# Patient Record
Sex: FEMALE | Race: WHITE | NOT HISPANIC OR LATINO | Employment: UNEMPLOYED | URBAN - METROPOLITAN AREA
[De-identification: names, ages, dates, MRNs, and addresses within clinical notes are randomized per-mention and may not be internally consistent; named-entity substitution may affect disease eponyms.]

---

## 2018-08-29 ENCOUNTER — OFFICE VISIT (OUTPATIENT)
Dept: ORTHOPEDIC SURGERY | Facility: CLINIC | Age: 48
End: 2018-08-29

## 2018-08-29 VITALS — HEIGHT: 63 IN | BODY MASS INDEX: 23.92 KG/M2 | WEIGHT: 135 LBS

## 2018-08-29 DIAGNOSIS — Z96.652 TOTAL KNEE REPLACEMENT STATUS, LEFT: Primary | ICD-10-CM

## 2018-08-29 DIAGNOSIS — G90.522 COMPLEX REGIONAL PAIN SYNDROME TYPE 1 OF LEFT LOWER EXTREMITY: ICD-10-CM

## 2018-08-29 PROCEDURE — 73560 X-RAY EXAM OF KNEE 1 OR 2: CPT | Performed by: ORTHOPAEDIC SURGERY

## 2018-08-29 PROCEDURE — 99205 OFFICE O/P NEW HI 60 MIN: CPT | Performed by: ORTHOPAEDIC SURGERY

## 2018-08-29 RX ORDER — CETIRIZINE HYDROCHLORIDE 10 MG/1
10 TABLET ORAL DAILY
Refills: 2 | COMMUNITY
Start: 2018-07-31

## 2018-08-29 RX ORDER — OXYCODONE AND ACETAMINOPHEN 10; 325 MG/1; MG/1
1 TABLET ORAL EVERY 4 HOURS PRN
Refills: 0 | COMMUNITY
Start: 2018-06-15 | End: 2018-09-13

## 2018-08-29 RX ORDER — MIDODRINE HYDROCHLORIDE 5 MG/1
2.5 TABLET ORAL
Refills: 12 | COMMUNITY
Start: 2018-06-01 | End: 2018-09-13

## 2018-08-29 RX ORDER — TOPIRAMATE 100 MG/1
100 TABLET, FILM COATED ORAL
Refills: 3 | COMMUNITY
Start: 2018-07-31

## 2018-08-29 RX ORDER — GABAPENTIN 300 MG/1
CAPSULE ORAL 3 TIMES DAILY
Refills: 2 | COMMUNITY
Start: 2018-07-31

## 2018-08-29 RX ORDER — ATENOLOL 25 MG/1
TABLET ORAL
COMMUNITY
Start: 2018-08-28

## 2018-08-29 RX ORDER — AMMONIUM LACTATE 12 G/100G
CREAM TOPICAL
Refills: 10 | COMMUNITY
Start: 2018-07-31

## 2018-08-29 RX ORDER — TRIAMCINOLONE ACETONIDE 1 MG/G
CREAM TOPICAL
Refills: 1 | COMMUNITY
Start: 2018-07-31

## 2018-08-29 RX ORDER — SUCRALFATE 1 G/1
1 TABLET ORAL
Refills: 5 | COMMUNITY
Start: 2018-07-31

## 2018-08-29 RX ORDER — FLUOXETINE HYDROCHLORIDE 40 MG/1
40 CAPSULE ORAL DAILY
Refills: 0 | COMMUNITY
Start: 2018-07-31

## 2018-08-29 RX ORDER — OXYCODONE HYDROCHLORIDE AND ACETAMINOPHEN 5; 325 MG/1; MG/1
1 TABLET ORAL EVERY 6 HOURS PRN
Refills: 0 | COMMUNITY
Start: 2018-06-05 | End: 2018-09-13

## 2018-08-29 RX ORDER — ISOSORBIDE MONONITRATE 30 MG/1
30 TABLET, EXTENDED RELEASE ORAL
Refills: 6 | COMMUNITY
Start: 2018-06-01

## 2018-08-29 RX ORDER — CYCLOSPORINE 0.5 MG/ML
EMULSION OPHTHALMIC
Refills: 2 | COMMUNITY
Start: 2018-07-31

## 2018-08-29 RX ORDER — VENLAFAXINE HYDROCHLORIDE 37.5 MG/1
37.5 CAPSULE, EXTENDED RELEASE ORAL 2 TIMES DAILY
Refills: 3 | COMMUNITY
Start: 2018-07-31

## 2018-08-29 RX ORDER — BETAMETHASONE DIPROPIONATE 0.5 MG/G
CREAM TOPICAL
Refills: 0 | COMMUNITY
Start: 2018-08-20

## 2018-08-29 RX ORDER — OXYCODONE AND ACETAMINOPHEN 7.5; 325 MG/1; MG/1
1 TABLET ORAL EVERY 8 HOURS PRN
Refills: 0 | COMMUNITY
Start: 2018-07-10 | End: 2018-09-13

## 2018-08-29 RX ORDER — HYDROCODONE BITARTRATE AND ACETAMINOPHEN 10; 325 MG/1; MG/1
1 TABLET ORAL 3 TIMES DAILY
Refills: 0 | COMMUNITY
Start: 2018-08-27

## 2018-08-29 RX ORDER — OMEPRAZOLE 20 MG/1
20 CAPSULE, DELAYED RELEASE ORAL DAILY
Refills: 3 | COMMUNITY
Start: 2018-07-31

## 2018-08-29 RX ORDER — PREDNISONE 1 MG/1
TABLET ORAL
Refills: 0 | COMMUNITY
Start: 2018-06-29 | End: 2018-09-13

## 2018-08-29 RX ORDER — ROPINIROLE 1 MG/1
1 TABLET, FILM COATED ORAL
Refills: 0 | COMMUNITY
Start: 2018-07-31

## 2018-08-29 RX ORDER — DIAZEPAM 5 MG/1
5 TABLET ORAL 3 TIMES DAILY
Refills: 0 | COMMUNITY
Start: 2018-08-03

## 2018-08-31 PROBLEM — Z96.652 TOTAL KNEE REPLACEMENT STATUS, LEFT: Status: ACTIVE | Noted: 2018-08-31

## 2018-08-31 PROBLEM — G90.522 COMPLEX REGIONAL PAIN SYNDROME TYPE 1 OF LEFT LOWER EXTREMITY: Status: ACTIVE | Noted: 2018-08-31

## 2018-09-10 ENCOUNTER — TELEPHONE (OUTPATIENT)
Dept: ORTHOPEDIC SURGERY | Facility: CLINIC | Age: 48
End: 2018-09-10

## 2018-09-10 NOTE — TELEPHONE ENCOUNTER
I feel that this conversation should be led by the MAs at the office of the pain clinic managing physician.  I really don't know the protocols of the pain clinic and therefore will not be able to tell the patient the details of what to expect at that visit.  Please refer the patient to the MAs at the pain clinic management office.  Thank you

## 2018-09-13 ENCOUNTER — RESULTS ENCOUNTER (OUTPATIENT)
Dept: PAIN MEDICINE | Facility: CLINIC | Age: 48
End: 2018-09-13

## 2018-09-13 ENCOUNTER — OFFICE VISIT (OUTPATIENT)
Dept: PAIN MEDICINE | Facility: CLINIC | Age: 48
End: 2018-09-13

## 2018-09-13 VITALS
SYSTOLIC BLOOD PRESSURE: 108 MMHG | HEIGHT: 63 IN | TEMPERATURE: 97.6 F | BODY MASS INDEX: 25.52 KG/M2 | DIASTOLIC BLOOD PRESSURE: 75 MMHG | RESPIRATION RATE: 16 BRPM | WEIGHT: 144 LBS | HEART RATE: 114 BPM | OXYGEN SATURATION: 98 %

## 2018-09-13 DIAGNOSIS — Z96.652 TOTAL KNEE REPLACEMENT STATUS, LEFT: ICD-10-CM

## 2018-09-13 DIAGNOSIS — G89.29 OTHER CHRONIC PAIN: Primary | ICD-10-CM

## 2018-09-13 DIAGNOSIS — G90.522 COMPLEX REGIONAL PAIN SYNDROME TYPE 1 OF LEFT LOWER EXTREMITY: ICD-10-CM

## 2018-09-13 DIAGNOSIS — G89.29 OTHER CHRONIC PAIN: ICD-10-CM

## 2018-09-13 LAB
POC AMPHETAMINES: NEGATIVE
POC BARBITURATES: NEGATIVE
POC BENZODIAZEPHINES: NEGATIVE
POC COCAINE: NEGATIVE
POC METHADONE: NEGATIVE
POC METHAMPHETAMINE SCREEN URINE: NEGATIVE
POC OPIATES: NEGATIVE
POC OXYCODONE: NEGATIVE
POC PHENCYCLIDINE: NEGATIVE
POC PROPOXYPHENE: NEGATIVE
POC THC: NEGATIVE
POC TRICYCLIC ANTIDEPRESSANTS: NEGATIVE

## 2018-09-13 PROCEDURE — 80305 DRUG TEST PRSMV DIR OPT OBS: CPT | Performed by: NURSE PRACTITIONER

## 2018-09-13 PROCEDURE — 99214 OFFICE O/P EST MOD 30 MIN: CPT | Performed by: NURSE PRACTITIONER

## 2018-09-13 NOTE — PATIENT INSTRUCTIONS
---  Education about Sympathetic Blockade:    The sympathetic nerve chain is part of the sympathetic nervous system that is located in your neck, on either side of your voice box. A lumbar sympathetic block is an injection of medication into these nerves that can help relieve pain in the groin or lower extremity or pelvis.   It also can help increase circulation and blood supply to the lower extremity    A sympathetic chain block may be right for you if you have nerve pain in the head, neck, upper arm or upper chest that does not respond to other treatment.  A stellate ganglion block is used to diagnose or treat circulation problems or nerve injuries, including but not limited to Complex Regional Pain Syndrome Types 1 or 2, also known as Reflex Sympathetic Dystrophy (RSD) or Causalgia, respectively, and also sometimes for Phantom Limb Pain or other nerve problems.      LSB Blockade:    First, you may be given an intravenous medication to relax you. Then, you’ll lie on your back on an x-ray table and your neck will be cleansed.  The doctor will insert a thin needle into your lower back near the spine, and inject a local anesthetic. Then, with x-ray or ultrasound guidance, he or she will insert a second needle to the location on the anterior aspect of the spine, in the vicinity of the sympathetic chain,  and carefully inject an anesthetic medication.  Usually, the procedure takes less than 30 minutes, and you can go home the same day after being monitored in the recovery room.  How effective is an LSB block?  Some patients report pain relief immediately after the injection, but the pain may return a few hours later as the local anesthetic wears off. Other patients have longer term relief that outlasts the duration of the local anesthetic and helps them to reduce their medication use and increase their participation in physical therapy.  How long the pain relief lasts is different for each patient. Some may be  pain-free for days or weeks. Usually people need a series of injections to continue the pain relief. Sometimes it takes only two injections; sometimes it takes more than 10. The relief tends to last longer with each treatment.  What are the risks?  The risk of complications from a block is very low. However, there could be bruising or soreness at the injection site. Serious complications, including infection, bleeding and nerve damage, are uncommon.  Side effects of the procedure may include:  Bleeding, tingling, numbness.   These effects will subside within a few hours.  What happens after the procedure?  Do not drive or do any rigorous activity for 24 hours after your LSB block.  Take it easy. You can return to your normal activities the next day.  If you have difficulty swallowing or voice changes, then wait until this resolves before eating a meal.    ---

## 2018-09-13 NOTE — PROGRESS NOTES
"CHIEF COMPLAINT  Pt is referred here per Dr Luna for L knee pain which apparently has worsened since having a L TKR on 4/11/18 per Dr East. Pt also had a L knee manipulation in 4/18 followed by a Lumbar LESTER with catheter for 24 hours with minimal increase in L knee ROM.  Standing and any activity while on feet increases knee pain significantly.  Pt had been on oxycodone for about 6 months post op TKR  and now taking Norco.  Hx of P.T. : moderate relief.    Subjective   So Hampton is a 47 y.o. female.   She presents to the office for evaluation of left knee pain. She was referred here by Dr. Luna.     Patient reports left knee pain for the past 2-3 years with progressive worsening of symptoms following knee surgery.  She underwent a left total knee arthroplasty on 4/11/2018 with Dr. East [facility].  She then underwent a left knee manipulation on 4/18/2018 because she was not progressing with PT.   Two weeks later she underwent a second manipulation and scope with lysis of adhesions.  She has had no improvement and has actually continued to decline since the surgeries.  She went to see Dr. Luna for a second opinion.  He thinks that her symptoms are consistent with CRPS and has referred here here for diagnostic/therapeutic lumbar blocks.      C/o left knee pain that is severe. Today her pain is an 8/10 in severity.  The pain is located around the entire knee. The pain is throbbing, aching.  It is constant.  The knee is sensitive to the touch.  It is swollen.  It is warm.  There is no hair growth.  It is sensitive to light touch, even the sheets touching it.  Pain is aggravated with walking, and weight bearing.  Improved with compression, massage, medication.  Has been taking pain medication since her neck surgery in 2014. She is taking hydrocodone 10/325 3/day, Gabapentin 300 mg TID (she reports feeling \"drunk\" with higher doses), takes Ibuprofen 800mg BID.  Reports some benefit with this " regimen, less benefit with Norco than Percocet. Reports that her PCP cannot write for percocet because he is in not in a pain clinic.      History of Present Illness     PEG Assessment   What number best describes your pain on average in the past week?8  What number best describes how, during the past week, pain has interfered with your enjoyment of life?8  What number best describes how, during the past week, pain has interfered with your general activity?  8        Current Outpatient Prescriptions:   •  ammonium lactate (AMLACTIN) 12 % cream, APPLY TO THE AFFECTED AREA(S) TWICE DAILY, Disp: , Rfl: 10  •  atenolol (TENORMIN) 25 MG tablet, , Disp: , Rfl:   •  betamethasone dipropionate (DIPROLENE) 0.05 % cream, APPLY TO THE AFFECTED AREA(S) TWICE DAILY AS NEEDED, Disp: , Rfl: 0  •  cetirizine (zyrTEC) 10 MG tablet, Take 10 mg by mouth Daily., Disp: , Rfl: 2  •  diazePAM (VALIUM) 5 MG tablet, Take 5 mg by mouth 3 (Three) Times a Day., Disp: , Rfl: 0  •  Elastic Bandages & Supports (MEDICAL COMPRESSION THIGH HIGH) misc, WEAR DAILY AS NEEDED 20-30mmHg, Disp: 1 each, Rfl: 0  •  FLUoxetine (PROzac) 40 MG capsule, Take 40 mg by mouth Daily., Disp: , Rfl: 0  •  gabapentin (NEURONTIN) 300 MG capsule, Take  by mouth 3 (Three) Times a Day., Disp: , Rfl: 2  •  HYDROcodone-acetaminophen (NORCO)  MG per tablet, Take 1 tablet by mouth 3 (Three) Times a Day., Disp: , Rfl: 0  •  isosorbide mononitrate (IMDUR) 30 MG 24 hr tablet, Take 30 mg by mouth every night at bedtime., Disp: , Rfl: 6  •  omeprazole (priLOSEC) 20 MG capsule, Take 20 mg by mouth Daily., Disp: , Rfl: 3  •  RESTASIS 0.05 % ophthalmic emulsion, INSTILL ONE DROP IN EACH EYE TWICE DAILY, Disp: , Rfl: 2  •  rOPINIRole (REQUIP) 1 MG tablet, Take 1 mg by mouth every night at bedtime., Disp: , Rfl: 0  •  sucralfate (CARAFATE) 1 g tablet, Take 1 g by mouth every night at bedtime., Disp: , Rfl: 5  •  topiramate (TOPAMAX) 100 MG tablet, Take 100 mg by mouth every night  "at bedtime., Disp: , Rfl: 3  •  triamcinolone (KENALOG) 0.1 % cream, Apply to affected area twice a day for 10-14 days, Disp: , Rfl: 1  •  venlafaxine XR (EFFEXOR-XR) 37.5 MG 24 hr capsule, Take 37.5 mg by mouth 2 (Two) Times a Day., Disp: , Rfl: 3    The following portions of the patient's history were reviewed and updated as appropriate: allergies, current medications, past family history, past medical history, past social history, past surgical history and problem list.    REVIEW OF PERTINENT MEDICAL DATA  Reviewed below office visit encounter with Dr. Luna 8/29/18    Xray 8/29/18             BUENO = 23    Review of Systems   Constitutional: Positive for activity change (decreased). Negative for appetite change.   HENT: Positive for trouble swallowing (hx of esophageal strtching). Negative for hearing loss.    Respiratory: Negative for apnea, chest tightness and shortness of breath.    Cardiovascular: Negative for chest pain.   Gastrointestinal: Negative for constipation, diarrhea and nausea.   Genitourinary: Negative for difficulty urinating and dysuria.   Musculoskeletal: Positive for back pain and neck pain.   Neurological: Negative for dizziness and light-headedness.   Psychiatric/Behavioral: Positive for sleep disturbance. Negative for suicidal ideas.     Vitals:    09/13/18 1148   BP: 108/75   Pulse: 114   Resp: 16   Temp: 97.6 °F (36.4 °C)   SpO2: 98%   Weight: 65.3 kg (144 lb)   Height: 160 cm (62.99\")   PainSc: 7  Comment: L knee pain ranges from 7-9/10   PainLoc: Knee       Objective   Physical Exam   Constitutional: She is oriented to person, place, and time. She appears well-developed and well-nourished. No distress.   HENT:   Head: Normocephalic and atraumatic.   Right Ear: External ear normal.   Left Ear: External ear normal.   Eyes: Pupils are equal, round, and reactive to light. Conjunctivae and EOM are normal.   Neck: Neck supple.   Cardiovascular: Normal rate, regular rhythm and normal heart " sounds.    Pulmonary/Chest: Effort normal and breath sounds normal. No respiratory distress.   Abdominal: Soft. Bowel sounds are normal.   Musculoskeletal:        Left knee: She exhibits decreased range of motion and swelling. Tenderness found.   Hyperesthesia left knee. Left knee warm compared to right knee. Left knee without hair compared to right knee.  Swelling of left knee compared to right.    Neurological: She is alert and oriented to person, place, and time. She has normal strength. A sensory deficit (left knee) is present. Gait (limping) abnormal.   Skin: Skin is warm and dry. No rash noted.   Psychiatric: She has a normal mood and affect. Her behavior is normal.   Nursing note and vitals reviewed.    Assessment/Plan   So was seen today for knee pain.    Diagnoses and all orders for this visit:    Other chronic pain    Complex regional pain syndrome type 1 of left lower extremity  -     Cancel: Case Request  -     Case Request    Total knee replacement status, left  -     Cancel: Case Request      ---  [place] Criteria (for Complex Regional Pain Syndrome diagnosis)    All of these criteria must be met to confirm a clinical diagnosis of CRPS:  ? Continuing Pain that is disproportionate to the inciting event... yes  ? At least 1 Symptom in at least 3 of the 4 Categories  ? At least 1 Sign from at least 2 of the 4 Categories  ? No other diagnosis can better explain the signs / symptoms  yes    Location of painful symptoms: Left knee    [facility] Clinical Symptoms Scores[facility]   Category: Symptom:   Sensory Hyperalgesia (heightened pain severity)   Vasomotor Difference in Skin Temperature   Sudomotor/Edema Swelling / Edema   Motor/Trophic Decreased Range of Motion and Changes in Hair    TOTAL:  4 out of 4     ---    --- left lumbar sympathetic block X 2, 2-4 weeks apart.  Reviewed the procedure at length with the patient.  Included in the review was expectations, complications, risk and benefits.The procedure  was described in detail and the risks, benefits and alternatives were discussed with the patient (including but not limited to: bleeding, infection, nerve damage, worsening of pain, inability to perform injection, paralysis, seizures, and death) who agreed to proceed.  Discussed the potential for sedation if warranted/wanted.  Questions were answered and in a way the patient could understand.  Patient verbalized understanding and wishes to proceed.  This intervention will be ordered.  --- Routine UDS in office today as part of new patient evaluation.  The specimen was viewed and the immunoassay result reviewed and is +OPI, BZD.  This specimen will be sent to [facility] laboratory for confirmation.     --- Patient asks if we will prescribe Percocet rather than the hydrocodone that she is currently taking.  Explained to her that our goal is to manage pain interventionally and that we reserve chronic pain medication for severe pain that has failed all conservative measures or for acute post operative pain. She is outside of the acute post operative period. She does have history of neck and back surgery in addition to the current issue with her knee and I explained to her that we could consider continuing her current medication regimen but that we would definitely not escalate her regimen at this very young age.  She declines opioid risk assessment if we will not consider dose escalation and switching her to Percocet. Furthermore, I explained to her that we do not prescribe opioids concurrently with patients taking benzodiazepines, The CDC and FDA both advise against prescribing of multiple medications that can suppress the CNS. I therefore do not feel that opioid pain medication would be in her best interest.   --- Follow-up after procedure          SAMEER REPORT    As part of the patient's treatment plan, I am prescribing controlled substances. The patient has been made aware of appropriate use of such medications,  including potential risk of somnolence, limited ability to drive and/or work safely, and the potential for dependence or overdose. It has also bee made clear that these medications are for use by this patient only, without concomitant use of alcohol or other substances unless prescribed.     Patient has completed prescribing agreement detailing terms of continued prescribing of controlled substances, including monitoring SAMEER reports, urine drug screening, and pill counts if necessary. The patient is aware that inappropriate use will results in cessation of prescribing such medications.    SAMEER report has been reviewed and scanned into the patient's chart.    As the clinician, I personally reviewed the SAMEER from 9/12/2018 while the patient was in the office today.    History and physical exam exhibit continued safe and appropriate use of controlled substances.     EMR Dragon/Transcription disclaimer:   Much of this encounter note is an electronic transcription/translation of spoken language to printed text. The electronic translation of spoken language may permit erroneous, or at times, nonsensical words or phrases to be inadvertently transcribed; Although I have reviewed the note for such errors, some may still exist.

## 2018-10-10 ENCOUNTER — TELEPHONE (OUTPATIENT)
Dept: PAIN MEDICINE | Facility: CLINIC | Age: 48
End: 2018-10-10

## 2018-10-24 ENCOUNTER — DOCUMENTATION (OUTPATIENT)
Dept: PAIN MEDICINE | Facility: CLINIC | Age: 48
End: 2018-10-24

## 2018-10-24 ENCOUNTER — OUTSIDE FACILITY SERVICE (OUTPATIENT)
Dept: PAIN MEDICINE | Facility: CLINIC | Age: 48
End: 2018-10-24

## 2018-10-24 PROCEDURE — 64520 N BLOCK LUMBAR/THORACIC: CPT | Performed by: PAIN MEDICINE

## 2018-10-24 NOTE — PROGRESS NOTES
Lumbar Sympathetic Blockade - left  [facility]    PREOPERATIVE DIAGNOSIS:    CRPS Type I of the  Left Lower Extremity    POSTOPERATIVE DIAGNOSIS: Same as preoperative dx    PROCEDURE:  Lumbar Sympathetic Block, left, with fluoroscopy                             Needle entry at: L3, L4,  • CPT 10875, Lumbar Sympathetic Blockade    PRE-PROCEDURE DISCUSSION WITH PATIENT:    Risks and complications were discussed with the patient prior to starting the procedure and informed consent was obtained.   We discussed various topics including but not limited to bleeding, infection, injury, nerve injury, paralysis, coma, death, postprocedural painful flare-up, temporary entire leg weakness, numbness, temperature change, postprocedural site soreness, and a lack of pain relief.  We discussed the diagnostic aspect of lumbar sympathetic nerve blockade.    SURGEON:  Gabbie Tijerina MD    REASON FOR PROCEDURE:    CRPS type I of the LLE following left knee surgery.      SEDATION:  Patient declined administration of moderate sedation      STEROID:   Dexamethasone 8mg     DESCRIPTON OF PROCEDURE:    After obtaining informed consent, I.V. was started in the preop area.   The patient was taken to the operating room and placed in the prone position. EKG, blood pressure, and pulse oximeter were monitored throughout, and sedation was provided as needed by the RN under my guidance. All pressure points were well padded.      Starting in the oblique view toward the appropriate side, the transverse process of the appropriate level was identified. At the injection level, the tip of the transverse process was overlying the anterolateral margin of the vertebral body. The skin and subcutaneous tissue was anesthetized with 1% lidocaine just cephalad and anterolateral to the verebral body. A 22-gauge spinal needle was introduced cephalad to the transverse process and under fluorocopic guidance with serial images every 1 cm until  the needle tip gently contacted the vertebral body. Under lateral view, the needle was gently advanced anteriorly until the tip was over the anteiror one-third of the vertebral body. Needle position was verified in the AP fluoroscopic view with the needle tip lying medial to the lateral margin of the vertebral body. Aspiration was verified to be negative. 4 ml of omnipaque was injected under real time fluoroscopy, in both the lateral and AP demension, to verify lack of vascular uptake and appropriate spread cephalad and caudal. After appropriate spread was confirmed, 5-8 ml of Lidocaine 1.0% with epinephrine 1:200,000 was injected with aspiration every couple ml. HR and BP was checked prior to injection, and one minute after injection, to again confirm lack of vascular uptake. With no change in HR or BP >10%, 10 ml of Marcaine 0.25% (without epi) with the steroid was injected with aspiration every couple ml. The needle was then removed intact.  Vital signs remained stable throughout.      ESTIMATED BLOOD LOSS:  <5 mL  SPECIMENS:  None    COMPLICATIONS: Patient experienced left sided low back spasms in recovery room. Improved with PO pain medication and valium for muscle spasms.  Was discharged home with family.     TOLERANCE & DISCHARGE CONDITION:    The patient tolerated the procedure well.  The patient was transported to the recovery area without difficulties.  The patient was monitored for at least 30 minutes after the procedure, and temperature increase in the ipsilateral upper extremity was noted.  The patient was discharged to home under the care of family in stable and satisfactory condition.    PLAN OF CARE:  1. The patient was given our standard instruction sheet.  2. The patient will Return to clinic 4 wks.    3. The patient will resume all medications as per the medication reconciliation sheet.

## 2018-11-08 ENCOUNTER — OFFICE VISIT (OUTPATIENT)
Dept: ORTHOPEDIC SURGERY | Facility: CLINIC | Age: 48
End: 2018-11-08

## 2018-11-08 DIAGNOSIS — Z96.652 TOTAL KNEE REPLACEMENT STATUS, LEFT: ICD-10-CM

## 2018-11-08 DIAGNOSIS — G90.522 COMPLEX REGIONAL PAIN SYNDROME TYPE 1 OF LEFT LOWER EXTREMITY: ICD-10-CM

## 2018-11-08 DIAGNOSIS — M25.561 RIGHT KNEE PAIN, UNSPECIFIED CHRONICITY: Primary | ICD-10-CM

## 2018-11-08 PROCEDURE — 73560 X-RAY EXAM OF KNEE 1 OR 2: CPT | Performed by: ORTHOPAEDIC SURGERY

## 2018-11-08 PROCEDURE — 99213 OFFICE O/P EST LOW 20 MIN: CPT | Performed by: ORTHOPAEDIC SURGERY

## 2018-11-08 NOTE — PROGRESS NOTES
Chief Complaint   Patient presents with   • Left Knee - Follow-up   • Right Knee - Pain             HPI the patient is here today for right and left knee pain and discomfort.  The patient states that she's been having pain on the medial aspect the right knee for about 10 years.  The symptoms are getting progressively worse with each passing year.  She has difficulty with going up and down the steps.  She has a progressive varus deformity.  Cross body activities bother her significantly.  She denies any recent history of trauma.  She states that she does get an effusion in the knee which limits her flexion.  She cannot squat on the ground because of the pain and discomfort.  Most of her symptoms are consistent with a sharp stabbing pain in the retropatellar region.  There is an associated clicking and popping or dislocation.  Symptoms are worse when she goes up and down the steps.  Unfortunately she underwent a knee replacement on the left side performed by Dr. East in [facility] in April 2018.  She had a very bad outcome from that side because she developed continuous and persistent pain.  She has also developed reflex sympathetic dystrophy and poor function following that knee replacement.  Based on her poor outcome from that knee replacement both the patient and I am reluctant to recommend any more surgery for the other knee for this patient.          No Known Allergies      Social History     Socioeconomic History   • Marital status:      Spouse name: Not on file   • Number of children: Not on file   • Years of education: Not on file   • Highest education level: Not on file   Social Needs   • Financial resource strain: Not on file   • Food insecurity - worry: Not on file   • Food insecurity - inability: Not on file   • Transportation needs - medical: Not on file   • Transportation needs - non-medical: Not on file   Occupational History   • Not on file   Tobacco Use   • Smoking status: Never  Smoker   • Smokeless tobacco: Never Used   Substance and Sexual Activity   • Alcohol use: No   • Drug use: No   • Sexual activity: Not on file   Other Topics Concern   • Not on file   Social History Narrative   • Not on file       No family history on file.    Past Surgical History:   Procedure Laterality Date   • BACK SURGERY     • GALLBLADDER SURGERY     • HYSTERECTOMY     • NECK SURGERY     • SPINAL CORD STIMULATOR IMPLANT         Past Medical History:   Diagnosis Date   • Depression    • Joint pain    • Low back pain    • Migraine    • Neck pain    • Osteoarthritis    • Peripheral neuropathy            There were no vitals filed for this visit.          Review of Systems   Constitutional: Negative.    HENT: Negative.    Eyes: Negative.    Respiratory: Negative.    Cardiovascular: Negative.    Gastrointestinal: Negative.    Endocrine: Negative.    Genitourinary: Negative.    Musculoskeletal: Positive for gait problem and joint swelling.   Skin: Negative.    Allergic/Immunologic: Negative.    Hematological: Negative.    Psychiatric/Behavioral: Negative.            Physical Exam   Constitutional: She is oriented to person, place, and time. She appears well-nourished.   HENT:   Head: Atraumatic.   Eyes: EOM are normal.   Neck: Neck supple.   Cardiovascular: Normal rate.   Pulmonary/Chest: Effort normal.   Abdominal: Bowel sounds are normal.   Musculoskeletal: She exhibits edema.   Neurological: She is oriented to person, place, and time.   Skin: Skin is warm. Capillary refill takes 2 to 3 seconds.   Psychiatric: She has a normal mood and affect. Her behavior is normal. Judgment and thought content normal.   Vitals reviewed.              Joint/Body Part Specific Exam:  left knee.The patient is status post total knee arthroplasty postoperative 7 month(s). Incision is clean. Calf is soft and nontender. Homans sign is negative. There is no clicking, popping or catching. Anterior and posterior drawer signs are negative.   There is no instability of the components. Appropriate amounts of swelling and bruising are noted. Dorsalis pedis and posterior tibial artery pulses are palpable. Common peroneal nerve function is well preserved. Range of motion is from 10- 120 degrees of flexion. Gait is cautious but otherwise fairly normal. There is no evidence of a deep seated joint infection.    left knee. Patient has crepitus throughout range of motion. Positive patellar grind test. Mild effusion. Lachman is negative. Pivot shift is negative. Anterior and posterior drawer signs are negative. Significant joint line tenderness is noted on the medial aspect of the knee. Patient has a varus orientation of the knee. There is fullness and tenderness in the Popliteal fossa. Mild distention of a Popliteal cyst is noted in this location. Range of motion in flexion is from 0- 110 degrees. Neurovascular status is intact.  Dorsalis pedis and posterior tibial artery pulses are palpable. Common peroneal nerve function is well preserved. Patient's gait is cautious and antalgic. Skin and soft tissues are mildly swollen, consistent with synovitis and effusion. The patient has a significant limp with the first few steps after starting the gait cycle. Getting out of a chair takes a lot of effort due to pain on knee flexion.      X-RAY Report:  bilateral Knee X-Ray  Indication: Evaluation of implant position on the left side and medial knee joint pain on the right side  AP, Lateral views  Findings: Acceptable position of the implants on the left knee and moderately advanced degenerative osteoarthritis of the medial compartment of the knee  no bony lesion  Soft tissues within normal limits  decreased joint spaces on the medial compartment of the right knee  Hardware appropriately positioned yes on the left knee following knee replacement surgery      yes prior studies available for comparison.    X-RAY was ordered and reviewed by Alessio Luna  MD          Diagnostics:            So was seen today for follow-up and pain.    Diagnoses and all orders for this visit:    Right knee pain, unspecified chronicity  -     XR Knee 1 or 2 View Right    Complex regional pain syndrome type 1 of left lower extremity    Total knee replacement status, left            Procedures          I provided this patient with educational materials regarding exercise, bone health and cast or splint care.        Plan: Discussed with the patient about using a brace on the right knee.    She has had such a bad outcome with her left knee replacement performed in [facility] that I would not recommend proceeding with a right knee replacement surgery just yet.  She is only 48 years of age and she needs to try conservative care for at least another decade before she can be considered a good candidate for surgical intervention on the knee on the other side.    Continue with aggressive range of motion exercises of both the knees with  Stretching of the quads and the hamstrings.    Calcium and vitamin D for bone health.    She needs to continue her relationship with the pain clinic managers for narcotic medication that she needs for the failed left knee arthroplasty.    I do not find any objective reason to provide her with surgical considerations on the left knee.    , GI and dental procedure prophylaxis with antibiotics to prevent metastatic infection of the knee arthroplasty.    Follow-up in my office in 6 months for reevaluation          CC To Matthew Zelaya DO

## 2018-11-24 PROBLEM — M25.561 RIGHT KNEE PAIN: Status: ACTIVE | Noted: 2018-11-24

## 2018-11-29 ENCOUNTER — TELEPHONE (OUTPATIENT)
Dept: ORTHOPEDIC SURGERY | Facility: CLINIC | Age: 48
End: 2018-11-29

## 2018-11-29 DIAGNOSIS — M25.561 RIGHT KNEE PAIN, UNSPECIFIED CHRONICITY: ICD-10-CM

## 2018-11-29 DIAGNOSIS — Z96.652 TOTAL KNEE REPLACEMENT STATUS, LEFT: Primary | ICD-10-CM

## 2018-11-29 DIAGNOSIS — G90.522 COMPLEX REGIONAL PAIN SYNDROME TYPE 1 OF LEFT LOWER EXTREMITY: ICD-10-CM

## 2018-11-29 NOTE — TELEPHONE ENCOUNTER
PATIENT WOULD LIKE TO KNOW IF SHE CAN BE REFERRED TO ANOTHER PAIN CLINIC. SHE HAS BEEN SEEING DR. ESQUIVEL FOR PAIN INJECTIONS HERE AND WAS SCHEDULED FOR A FOLLOW-UP TODAY. SHE STATED THAT SHE WAS 25 MINUTES LATE TO HER APPOINTMENT TODAY AND WAS UNABLE TO CALL THEM TO LET THEM KNOW. SHE WAS UPSET THAT THEY WOULD NOT STILL SEE HER. I EXPLAINED TO HER THAT WE WOULD NOT SEE HER IF SHE WAS OVER 15 MINUTES LATE AND THAT WASN'T AN UNCOMMON OFFICE POLICY. SHE THEN STATED THAT THE STAFF AT PAIN MANAGEMENT WAS VERY RUDE TO HER AND SHE DOES NOT WANT TO GO BACK THERE. I TOLD HER THAT I DID NOT KNOW HOW THAT WOULD WORK WITH HER BEING ESTABLISHED AT THAT PAIN MANAGEMENT IF WE COULD JUST REFER HER TO ANOTHER CLINIC OR NOT. SHE CAN BE REACHED [phone]./AW

## 2018-11-30 NOTE — TELEPHONE ENCOUNTER
Yes let us see if she can be accommodated at a different pain clinic.  She will have to know that I cannot prescribe her narcotics for chronic pain on a long-term basis.

## 2018-12-05 DIAGNOSIS — M25.562 LEFT KNEE PAIN, UNSPECIFIED CHRONICITY: Primary | ICD-10-CM

## 2018-12-05 PROCEDURE — 73560 X-RAY EXAM OF KNEE 1 OR 2: CPT | Performed by: ORTHOPAEDIC SURGERY

## 2019-05-09 ENCOUNTER — OFFICE VISIT (OUTPATIENT)
Dept: ORTHOPEDIC SURGERY | Facility: CLINIC | Age: 49
End: 2019-05-09

## 2019-05-09 VITALS — HEIGHT: 63 IN | BODY MASS INDEX: 26.58 KG/M2 | WEIGHT: 150 LBS

## 2019-05-09 DIAGNOSIS — M54.16 CHRONIC RADICULAR LUMBAR PAIN: ICD-10-CM

## 2019-05-09 DIAGNOSIS — M25.561 RIGHT KNEE PAIN, UNSPECIFIED CHRONICITY: ICD-10-CM

## 2019-05-09 DIAGNOSIS — Z96.652 TOTAL KNEE REPLACEMENT STATUS, LEFT: Primary | ICD-10-CM

## 2019-05-09 DIAGNOSIS — G89.29 CHRONIC RADICULAR LUMBAR PAIN: ICD-10-CM

## 2019-05-09 PROCEDURE — 99213 OFFICE O/P EST LOW 20 MIN: CPT | Performed by: ORTHOPAEDIC SURGERY

## 2019-05-09 PROCEDURE — 73562 X-RAY EXAM OF KNEE 3: CPT | Performed by: ORTHOPAEDIC SURGERY

## 2019-05-09 RX ORDER — MELOXICAM 7.5 MG/1
TABLET ORAL
Qty: 40 TABLET | Refills: 3 | Status: SHIPPED | OUTPATIENT
Start: 2019-05-09

## 2019-06-01 PROBLEM — G89.29 CHRONIC RADICULAR LUMBAR PAIN: Status: ACTIVE | Noted: 2019-06-01

## 2019-06-01 PROBLEM — M54.16 CHRONIC RADICULAR LUMBAR PAIN: Status: ACTIVE | Noted: 2019-06-01

## 2019-08-05 ENCOUNTER — TELEPHONE (OUTPATIENT)
Dept: ORTHOPEDIC SURGERY | Facility: CLINIC | Age: 49
End: 2019-08-05

## 2019-08-05 NOTE — TELEPHONE ENCOUNTER
If she has been diagnosed with RSD she will then need to be looked at by the pain clinic specialist.  I would recommend getting an epidural block involving the lumbar plexus to address the RSD.  1 of the things about RSD is never to have more surgery on it because that will flare up the condition.  Please call her and let her know.  Thank you

## 2019-08-05 NOTE — TELEPHONE ENCOUNTER
PATIENT CALLED IN STATING THAT SHE HAS HAD INCREASED PAIN IN THAT TOTAL KNEE THAT DR. FALL DID. SHE STATES THAT SHE CANNOT KEEP HER LEG STILL, SHE HAS TO MOVE IT ALL THE TIME. SHE STATES THAT IT STAYS WARM TO THE TOUCH AND SEEMS TO BE INFLAMMED ALL THE TIME. SHE STATES THAT SHE CANNOT GET HER PAIN UNDER CONTROL. SHE'S ON MOBIC AND PERCOCET 10 THROUGH THE PAIN CLINIC. SHE'S BEEN DIAGNOSED WITH RSD. PLEASE ADVISE.

## 2019-08-09 NOTE — TELEPHONE ENCOUNTER
Patient is already attached with a pain specialists and is already receiving the epidural block with lumbar plexus but it is not helping. Please Advise.

## 2019-08-11 NOTE — TELEPHONE ENCOUNTER
The only other option I have is to make her an appointment to go see Dr. Jared Francis who is a joint revision specialist in [place].  She may be able to give her a second opinion to see if she requires any form of surgical consideration.

## 2019-08-20 NOTE — TELEPHONE ENCOUNTER
Attempted to contact the patient to inform her of Dr. Luna's recommendations but she has a voicemail box that has not been set up yet.

## 2019-10-24 ENCOUNTER — OFFICE VISIT (OUTPATIENT)
Dept: ORTHOPEDIC SURGERY | Facility: CLINIC | Age: 49
End: 2019-10-24

## 2019-10-24 VITALS — HEIGHT: 63 IN | BODY MASS INDEX: 27.25 KG/M2 | WEIGHT: 153.8 LBS

## 2019-10-24 DIAGNOSIS — Z96.652 HISTORY OF LEFT KNEE REPLACEMENT: Primary | ICD-10-CM

## 2019-10-24 PROCEDURE — 73560 X-RAY EXAM OF KNEE 1 OR 2: CPT | Performed by: ORTHOPAEDIC SURGERY

## 2019-10-24 PROCEDURE — 99213 OFFICE O/P EST LOW 20 MIN: CPT | Performed by: ORTHOPAEDIC SURGERY

## 2019-10-24 RX ORDER — OMEPRAZOLE 40 MG/1
CAPSULE, DELAYED RELEASE ORAL
COMMUNITY
Start: 2019-10-09

## 2019-10-24 RX ORDER — IPRATROPIUM BROMIDE 21 UG/1
SPRAY, METERED NASAL
COMMUNITY
Start: 2019-10-22

## 2019-10-24 RX ORDER — OXYCODONE AND ACETAMINOPHEN 10; 325 MG/1; MG/1
1 TABLET ORAL 3 TIMES DAILY PRN
Refills: 0 | COMMUNITY
Start: 2019-10-10

## 2019-10-24 RX ORDER — ESTRADIOL 1 MG/1
TABLET ORAL
COMMUNITY
Start: 2019-10-14

## 2019-10-24 RX ORDER — PSEUDOPHEDRINE HCL 30 MG/1
TABLET, FILM COATED ORAL
Refills: 0 | COMMUNITY
Start: 2019-08-22

## 2019-10-24 RX ORDER — BUPROPION HYDROCHLORIDE 150 MG/1
TABLET, EXTENDED RELEASE ORAL
COMMUNITY
Start: 2019-10-14

## 2019-10-24 RX ORDER — RANITIDINE 300 MG/1
TABLET ORAL
Refills: 0 | COMMUNITY
Start: 2019-08-07

## 2019-10-24 NOTE — PROGRESS NOTES
"FOLLOW UP VISIT    Patient: So Hampton  ?  YOB: 1970    MRN: [id number]  ?  Chief Complaint   Patient presents with   • Right Knee - Follow-up   • Left Knee - Follow-up      ?  HPI: The patient follows up on a total knee arthroplasty performed by Dr. East in [facility] in April 2018.  She states that the knee replacement has never felt right.  She states \"it feels like cement and my knee is very unstable \".  She is very unhappy with her knee replacement because she cannot play with her grandkids.  She is now in pain management for chronic management of the pain and symptoms.  She states that she has numbness and tingling over the lateral aspect of the incision.  \"I am in constant pain in my quality of life is nil \".  She states that she has tried every form of physical therapy and bracing and it has not helped her.  She is unable to hyperextend this knee compared to the opposite side.  She is also unable to flex it as much as the other knee and that bothers her tremendously.  It was my impression that she had developed reflex sympathetic dystrophy from which she is starting to recover but still her knee is affecting her quality of life in a negative fashion.  I have counseled her about making a referral to a joint replacement specialist to offer her possible surgical intervention to redeem the dysfunctional knee replacement surgery that she has had.    Pain Location: left knee(s)  Radiation: none  Quality: dull, aching  Intensity/Severity: severe  Duration: 1.5 year(s)  Onset quality: gradual   Timing: constant  Aggravating Factors: going up and down stairs, kneeling, rising after sitting, walking/running, squatting, walking or standing for prolonged time  Alleviating Factors: OTC analgesics, NSAID's, use of brace  Previous Episodes: yes  Associated Symptoms: pain, swelling, decreased ROM, decreased strength  ADLs Affected: ambulating, work related activities, recreational " activities/sports  Previous Treatment: She has had a total knee arthroplasty performed in April 2018 on the left side.    This patient is an established patient.  This problem is not new to this examiner.      Allergies: No Known Allergies    Medications:   Home Medications:  Current Outpatient Medications on File Prior to Visit   Medication Sig   • ammonium lactate (AMLACTIN) 12 % cream APPLY TO THE AFFECTED AREA(S) TWICE DAILY   • atenolol (TENORMIN) 25 MG tablet    • betamethasone dipropionate (DIPROLENE) 0.05 % cream APPLY TO THE AFFECTED AREA(S) TWICE DAILY AS NEEDED   • buPROPion SR (WELLBUTRIN SR) 150 MG 12 hr tablet    • cetirizine (zyrTEC) 10 MG tablet Take 10 mg by mouth Daily.   • diazePAM (VALIUM) 5 MG tablet Take 5 mg by mouth 3 (Three) Times a Day.   • Elastic Bandages & Supports (MEDICAL COMPRESSION THIGH HIGH) misc WEAR DAILY AS NEEDED 20-30mmHg   • estradiol (ESTRACE) 1 MG tablet    • FLUoxetine (PROzac) 40 MG capsule Take 40 mg by mouth Daily.   • gabapentin (NEURONTIN) 300 MG capsule Take  by mouth 3 (Three) Times a Day.   • HYDROcodone-acetaminophen (NORCO)  MG per tablet Take 1 tablet by mouth 3 (Three) Times a Day.   • ipratropium (ATROVENT) 0.03 % nasal spray    • isosorbide mononitrate (IMDUR) 30 MG 24 hr tablet Take 30 mg by mouth every night at bedtime.   • meloxicam (MOBIC) 7.5 MG tablet 1 Oral Daily with food.   • omeprazole (priLOSEC) 20 MG capsule Take 20 mg by mouth Daily.   • omeprazole (priLOSEC) 40 MG capsule    • oxyCODONE-acetaminophen (PERCOCET)  MG per tablet Take 1 tablet by mouth 3 (Three) Times a Day As Needed.   • raNITIdine (ZANTAC) 300 MG tablet    • RESTASIS 0.05 % ophthalmic emulsion INSTILL ONE DROP IN EACH EYE TWICE DAILY   • rOPINIRole (REQUIP) 1 MG tablet Take 1 mg by mouth every night at bedtime.   • sucralfate (CARAFATE) 1 g tablet Take 1 g by mouth every night at bedtime.   • topiramate (TOPAMAX) 100 MG tablet Take 100 mg by mouth every night at  bedtime.   • triamcinolone (KENALOG) 0.1 % cream Apply to affected area twice a day for 10-14 days   • venlafaxine XR (EFFEXOR-XR) 37.5 MG 24 hr capsule Take 37.5 mg by mouth 2 (Two) Times a Day.   • WAL-PHED 30 MG tablet take 1 to 2 tablets by mouth every 4 hours if needed for congestion     No current facility-administered medications on file prior to visit.      Current Medications:  Scheduled Meds:  PRN Meds:.    I have reviewed the patient's medical history in detail and updated the computerized patient record.  Review and summarization of old records include:    Past Medical History:   Diagnosis Date   • Depression    • Joint pain    • Low back pain    • Migraine    • Neck pain    • Osteoarthritis    • Peripheral neuropathy      Past Surgical History:   Procedure Laterality Date   • BACK SURGERY     • GALLBLADDER SURGERY     • HYSTERECTOMY     • KNEE ARTHROPLASTY Left 04/11/2018    Dr. East   • NECK SURGERY     • SPINAL CORD STIMULATOR IMPLANT       Social History     Occupational History   • Not on file   Tobacco Use   • Smoking status: Never Smoker   • Smokeless tobacco: Never Used   Substance and Sexual Activity   • Alcohol use: No   • Drug use: No   • Sexual activity: Not on file      Social History     Social History Narrative   • Not on file     No family history on file.      Review of Systems   Constitutional: Negative.  Negative for fever.   HENT: Negative.    Eyes: Negative.    Respiratory: Negative.    Cardiovascular: Negative.    Gastrointestinal: Negative.    Endocrine: Negative.    Genitourinary: Negative.    Musculoskeletal: Positive for arthralgias, gait problem and joint swelling.   Skin: Negative.  Negative for rash and wound.   Allergic/Immunologic: Negative.    Neurological: Negative for numbness.   Hematological: Negative.    Psychiatric/Behavioral: Negative.           Wt Readings from Last 3 Encounters:   10/24/19 69.8 kg (153 lb 12.8 oz)   05/09/19 68 kg (150 lb)   09/13/18 65.3  "kg (144 lb)     Ht Readings from Last 3 Encounters:   10/24/19 160 cm (63\")   05/09/19 160 cm (63\")   09/13/18 160 cm (62.99\")     Body mass index is 27.24 kg/m².  Facility age limit for growth percentiles is 20 years.  There were no vitals filed for this visit.      Physical Exam  Constitutional: Patient is oriented to person, place, and time. Appears well-developed and well-nourished.   HENT:   Head: Normocephalic and atraumatic.   Eyes: Conjunctivae and EOM are normal. Pupils are equal, round, and reactive to light.   Cardiovascular: Normal rate, regular rhythm, normal heart sounds and intact distal pulses.   Pulmonary/Chest: Effort normal and breath sounds normal.   Musculoskeletal:   See detailed exam below   Neurological: Alert and oriented to person, place, and time. No sensory deficit. Coordination normal.   Skin: Skin is warm and dry. Capillary refill takes less than 2 seconds. No rash noted. No erythema.   Psychiatric: Patient has a normal mood and affect. Her behavior is normal. Judgment and thought content normal.   Nursing note and vitals reviewed.      Ortho Exam:     Left knee.The patient is status post total knee arthroplasty postoperative 1.5 year(s). Incision is clean. Calf is soft and nontender. Homans sign is negative. There is no clicking, popping or catching. Anterior and posterior drawer signs are negative.  There is no instability of the components. Appropriate amounts of swelling and bruising are noted. Dorsalis pedis and posterior tibial artery pulses are palpable. Common peroneal nerve function is well preserved. Range of motion is from 0-130 degrees of flexion. Gait is cautious but otherwise fairly normal. There is no evidence of a deep seated joint infection.  Right knee flexes to 140 degrees.  She states that it bothers her that she cannot flex her left knee to the same extent that she cannot flex her right knee.          Diagnostics:  xrays obtained today  left Knee X-Ray  Indication: " EValuation of limited range of motion and pain following total knee arthroplasty.  AP, Lateral views  Findings: Well-placed total knee arthroplasty without any evidence of subsidence or loosening of the components.  no bony lesion  Soft tissues within normal limits  within normal limits joint spaces  Hardware appropriately positioned yes      yes prior studies available for comparison.    This patient's x-ray report was graded according to the Kellgren and Dylan classification.  This took into account the joint space narrowing, osteophyte formation, ot sclerosis of the distal femur/proximal tibia along with deformity of those bones.  The findings were indicative of K L grade napplicable.    X-RAY was ordered and reviewed by Alessio Luna MD      Assessment:  So was seen today for follow-up and follow-up.    Diagnoses and all orders for this visit:    History of left knee replacement  -     XR Knee 1 or 2 View Left  -     Ambulatory Referral to Orthopedic Surgery          Procedures  ?    Plan    · Compression/brace to support the knee and to prevent it from buckling and giving out.  · My recommendations are that she should be obtaining her pain medication from the pain management clinic where she is attached in [facility].  · I do not have any expertise to offer her surgical relief from her symptoms and therefore I am going to refer her to Dr. Jared Francis, [employer] [facility] total joint revision surgeon for possible consideration with possible joint revision surgery in mind.  Line , GI and dental procedure prophylaxis with antibiotics to prevent metastatic infection to the knee arthroplasty implants.  · Rest, ice, compression, and elevation (RICE) therapy  · Stretching and strengthening exercises of the quads and the hamstrings.  · Tylenol 500-1000mg by mouth every 6 hours as needed for pain   · Follow up in 3 month(s)    Date of encounter: 10/24/2019   Alessio Luna MD